# Patient Record
Sex: MALE | Race: BLACK OR AFRICAN AMERICAN | NOT HISPANIC OR LATINO
[De-identification: names, ages, dates, MRNs, and addresses within clinical notes are randomized per-mention and may not be internally consistent; named-entity substitution may affect disease eponyms.]

---

## 2020-11-05 PROBLEM — Z00.00 ENCOUNTER FOR PREVENTIVE HEALTH EXAMINATION: Status: ACTIVE | Noted: 2020-11-05

## 2020-11-11 ENCOUNTER — APPOINTMENT (OUTPATIENT)
Dept: CARDIOLOGY | Facility: CLINIC | Age: 85
End: 2020-11-11
Payer: MEDICARE

## 2020-11-11 VITALS
DIASTOLIC BLOOD PRESSURE: 65 MMHG | HEIGHT: 68 IN | WEIGHT: 138 LBS | HEART RATE: 61 BPM | SYSTOLIC BLOOD PRESSURE: 120 MMHG | BODY MASS INDEX: 20.92 KG/M2

## 2020-11-11 DIAGNOSIS — Z72.89 OTHER PROBLEMS RELATED TO LIFESTYLE: ICD-10-CM

## 2020-11-11 DIAGNOSIS — Z83.3 FAMILY HISTORY OF DIABETES MELLITUS: ICD-10-CM

## 2020-11-11 DIAGNOSIS — R56.9 UNSPECIFIED CONVULSIONS: ICD-10-CM

## 2020-11-11 PROCEDURE — 99205 OFFICE O/P NEW HI 60 MIN: CPT

## 2020-11-11 PROCEDURE — 93000 ELECTROCARDIOGRAM COMPLETE: CPT

## 2020-11-11 RX ORDER — NEPHROCAP 1 MG
CAP ORAL DAILY
Refills: 0 | Status: ACTIVE | COMMUNITY

## 2020-11-11 NOTE — HISTORY OF PRESENT ILLNESS
[FreeTextEntry1] :  This 89 year old male recently relocated to [place] from [place] and is seeking to establish care. He had TAVR in [place]  a few months ago,and a stent about a year ago- he  still feels dyspnea on exertion ( no change since prior to procedures), . He goes to [facility] 3 days a week.\par He denies chest pain, dyspnea at rest , palpitations or syncope.\par He denies pnd, orthopnea or pedal edema\par He thinks he may have been having seizures which began December 2019.\par He lost his wife July 2020. While in [place] he had an episode and was taken to the er at [facility] in [place] and a few days later at [facility].\par He will be seeing Malissa Tom this friday to establish care.

## 2020-12-08 ENCOUNTER — APPOINTMENT (OUTPATIENT)
Dept: CARDIOLOGY | Facility: CLINIC | Age: 85
End: 2020-12-08
Payer: MEDICARE

## 2020-12-08 VITALS
HEART RATE: 68 BPM | BODY MASS INDEX: 20.92 KG/M2 | DIASTOLIC BLOOD PRESSURE: 60 MMHG | WEIGHT: 138 LBS | OXYGEN SATURATION: 100 % | HEIGHT: 68 IN | SYSTOLIC BLOOD PRESSURE: 140 MMHG

## 2020-12-08 PROCEDURE — 93280 PM DEVICE PROGR EVAL DUAL: CPT

## 2020-12-08 PROCEDURE — 99072 ADDL SUPL MATRL&STAF TM PHE: CPT

## 2020-12-08 RX ORDER — GLIMEPIRIDE 4 MG/1
4 TABLET ORAL
Refills: 0 | Status: ACTIVE | COMMUNITY

## 2020-12-08 RX ORDER — NITROGLYCERIN 400 UG/1
0.4 SPRAY ORAL
Refills: 0 | Status: ACTIVE | COMMUNITY

## 2020-12-08 NOTE — DISCUSSION/SUMMARY
[Pacemaker Function Normal] : normal pacemaker function [Patient] : the patient [Family] : the patient's family [de-identified] : Follow up with Dr. Villanueva on 12/23/20 as scheduled. Device interrogation in 6 months.

## 2020-12-08 NOTE — REASON FOR VISIT
[Follow-up Device Check] : is here today for a follow-up device check visit for [Other ___] : [unfilled] [Family Member] : family member

## 2020-12-08 NOTE — PROCEDURE
[No] : not [NSR] : normal sinus rhythm [Pacemaker] : pacemaker [DDD] : DDD [de-identified] : St. Flip [de-identified] : Assurity MRI 4340 [de-identified] : [id number] [de-identified] : 9/14/2018 [de-identified] : 50/90 [de-identified] : 11.2-12.4 years [de-identified] : increased PVARP to 325ms

## 2020-12-08 NOTE — PHYSICAL EXAM
[Normal Appearance] : normal appearance [Well Groomed] : well groomed [General Appearance - In No Acute Distress] : no acute distress [Heart Rate And Rhythm] : heart rate and rhythm were normal [] : no respiratory distress [Respiration, Rhythm And Depth] : normal respiratory rhythm and effort

## 2020-12-08 NOTE — REVIEW OF SYSTEMS
[Feeling Fatigued] : feeling fatigued [Dyspnea on exertion] : dyspnea during exertion [Headache] : no headache [Chest Pain] : no chest pain [Palpitations] : no palpitations [Dizziness] : no dizziness

## 2020-12-08 NOTE — HISTORY OF PRESENT ILLNESS
improved [SOB] : dyspnea [Palpitations] : no palpitations [Syncope] : no syncope [Dizziness] : no dizziness [Chest Pain] : no chest pain or discomfort [de-identified] : \par 89 year old male with aortic stenosis s/p TAVR, CAD s/p PCI 2019 and most recently on 9/1/2020 s/p CHRIS of LAD D1, PTCA of LAD and ostium of D1, PTCA of severe ISR of Ramus Intermedius\par \par \par , ESRD on HD via right chest HD cath, hypertension, hyperlipidemia, DM type 2, seizures, s/p St[employer] Flip dual chamber pacemaker on 9/14/2018 for symptomatic bradycardia.

## 2020-12-16 ENCOUNTER — APPOINTMENT (OUTPATIENT)
Dept: NEPHROLOGY | Facility: CLINIC | Age: 85
End: 2020-12-16

## 2020-12-23 ENCOUNTER — APPOINTMENT (OUTPATIENT)
Dept: CARDIOLOGY | Facility: CLINIC | Age: 85
End: 2020-12-23
Payer: MEDICARE

## 2020-12-23 VITALS
DIASTOLIC BLOOD PRESSURE: 80 MMHG | HEART RATE: 82 BPM | BODY MASS INDEX: 20.83 KG/M2 | SYSTOLIC BLOOD PRESSURE: 138 MMHG | WEIGHT: 137 LBS

## 2020-12-23 VITALS — SYSTOLIC BLOOD PRESSURE: 148 MMHG | DIASTOLIC BLOOD PRESSURE: 68 MMHG

## 2020-12-23 DIAGNOSIS — Z87.438 PERSONAL HISTORY OF OTHER DISEASES OF MALE GENITAL ORGANS: ICD-10-CM

## 2020-12-23 PROCEDURE — 99358 PROLONG SERVICE W/O CONTACT: CPT

## 2020-12-23 PROCEDURE — 93000 ELECTROCARDIOGRAM COMPLETE: CPT

## 2020-12-23 PROCEDURE — 99215 OFFICE O/P EST HI 40 MIN: CPT

## 2020-12-23 PROCEDURE — 99072 ADDL SUPL MATRL&STAF TM PHE: CPT

## 2020-12-23 RX ORDER — AMIODARONE HYDROCHLORIDE 200 MG/1
200 TABLET ORAL DAILY
Refills: 0 | Status: DISCONTINUED | COMMUNITY
End: 2020-12-23

## 2020-12-23 NOTE — REASON FOR VISIT
[Follow-Up - Clinic] : a clinic follow-up of [Other: _____] : [unfilled] [FreeTextEntry2] : 1 month [FreeTextEntry1] : tavr

## 2020-12-23 NOTE — HISTORY OF PRESENT ILLNESS
[FreeTextEntry1] :  This 89 year old male recently relocated to [place] from [place] and is seeking to establish care. He had TAVR in [place]  a few months ago,and a stent about a year ago- he  still feels dyspnea on exertion ( no change since prior to procedures), . He goes to dialysis [facility] 3 days a week, will be changing to [facility].\par He denies ,, palpitations or syncope.\par He denies pnd, orthopnea or pedal edema\par He thinks he may have been having seizures which began December 2019.He had another seizure yesterday, he is under the care of Dr Tidwell neurology.He will be having an mri.\par He lost his wife July 2020. While in [place] he had an episode and was taken to the er at [facility] in [place] and a few days later at [facility].\par He is now under the care of Dr Rios. \par last week he had mild left sided chest discomfort  while sleeping, .it has not recurred. he feels dyspneic with exertion.

## 2021-01-01 ENCOUNTER — APPOINTMENT (OUTPATIENT)
Dept: CARDIOLOGY | Facility: CLINIC | Age: 86
End: 2021-01-01

## 2021-01-01 ENCOUNTER — RX CHANGE (OUTPATIENT)
Age: 86
End: 2021-01-01

## 2021-01-01 ENCOUNTER — NON-APPOINTMENT (OUTPATIENT)
Age: 86
End: 2021-01-01

## 2021-01-01 ENCOUNTER — APPOINTMENT (OUTPATIENT)
Dept: CARDIOLOGY | Facility: CLINIC | Age: 86
End: 2021-01-01
Payer: MEDICARE

## 2021-01-01 ENCOUNTER — RX RENEWAL (OUTPATIENT)
Age: 86
End: 2021-01-01

## 2021-01-01 ENCOUNTER — RESULT REVIEW (OUTPATIENT)
Age: 86
End: 2021-01-01

## 2021-01-01 ENCOUNTER — TRANSCRIPTION ENCOUNTER (OUTPATIENT)
Age: 86
End: 2021-01-01

## 2021-01-01 VITALS
DIASTOLIC BLOOD PRESSURE: 56 MMHG | DIASTOLIC BLOOD PRESSURE: 50 MMHG | HEART RATE: 73 BPM | SYSTOLIC BLOOD PRESSURE: 112 MMHG | OXYGEN SATURATION: 94 % | SYSTOLIC BLOOD PRESSURE: 120 MMHG | HEART RATE: 70 BPM

## 2021-01-01 VITALS
SYSTOLIC BLOOD PRESSURE: 110 MMHG | OXYGEN SATURATION: 95 % | HEART RATE: 74 BPM | DIASTOLIC BLOOD PRESSURE: 50 MMHG | HEART RATE: 70 BPM | OXYGEN SATURATION: 95 % | HEIGHT: 66 IN | SYSTOLIC BLOOD PRESSURE: 110 MMHG | DIASTOLIC BLOOD PRESSURE: 50 MMHG

## 2021-01-01 VITALS
DIASTOLIC BLOOD PRESSURE: 78 MMHG | HEIGHT: 66 IN | WEIGHT: 150 LBS | BODY MASS INDEX: 24.11 KG/M2 | SYSTOLIC BLOOD PRESSURE: 122 MMHG

## 2021-01-01 VITALS — TEMPERATURE: 97.7 F | SYSTOLIC BLOOD PRESSURE: 112 MMHG | DIASTOLIC BLOOD PRESSURE: 80 MMHG | HEIGHT: 66 IN

## 2021-01-01 DIAGNOSIS — I27.20 PULMONARY HYPERTENSION, UNSPECIFIED: ICD-10-CM

## 2021-01-01 DIAGNOSIS — Z95.2 PRESENCE OF PROSTHETIC HEART VALVE: ICD-10-CM

## 2021-01-01 DIAGNOSIS — I25.10 ATHEROSCLEROTIC HEART DISEASE OF NATIVE CORONARY ARTERY W/OUT ANGINA PECTORIS: ICD-10-CM

## 2021-01-01 DIAGNOSIS — R07.89 OTHER CHEST PAIN: ICD-10-CM

## 2021-01-01 DIAGNOSIS — Z95.5 PRESENCE OF CORONARY ANGIOPLASTY IMPLANT AND GRAFT: ICD-10-CM

## 2021-01-01 DIAGNOSIS — I10 ESSENTIAL (PRIMARY) HYPERTENSION: ICD-10-CM

## 2021-01-01 DIAGNOSIS — I34.0 NONRHEUMATIC MITRAL (VALVE) INSUFFICIENCY: ICD-10-CM

## 2021-01-01 DIAGNOSIS — I47.2 VENTRICULAR TACHYCARDIA: ICD-10-CM

## 2021-01-01 DIAGNOSIS — E78.5 HYPERLIPIDEMIA, UNSPECIFIED: ICD-10-CM

## 2021-01-01 DIAGNOSIS — R06.00 DYSPNEA, UNSPECIFIED: ICD-10-CM

## 2021-01-01 DIAGNOSIS — I51.9 HEART DISEASE, UNSPECIFIED: ICD-10-CM

## 2021-01-01 DIAGNOSIS — E11.9 TYPE 2 DIABETES MELLITUS W/OUT COMPLICATIONS: ICD-10-CM

## 2021-01-01 DIAGNOSIS — Z86.79 PERSONAL HISTORY OF OTHER DISEASES OF THE CIRCULATORY SYSTEM: ICD-10-CM

## 2021-01-01 LAB
ANION GAP SERPL CALC-SCNC: 17 MMOL/L
BUN SERPL-MCNC: 42 MG/DL
CALCIUM SERPL-MCNC: 8.6 MG/DL
CHLORIDE SERPL-SCNC: 91 MMOL/L
CO2 SERPL-SCNC: 25 MMOL/L
CREAT SERPL-MCNC: 4.52 MG/DL
CRP SERPL HS-MCNC: 35.16 MG/L
GLUCOSE SERPL-MCNC: 181 MG/DL
POTASSIUM SERPL-SCNC: NORMAL MMOL/L
SODIUM SERPL-SCNC: 134 MMOL/L

## 2021-01-01 PROCEDURE — 93000 ELECTROCARDIOGRAM COMPLETE: CPT

## 2021-01-01 PROCEDURE — 93280 PM DEVICE PROGR EVAL DUAL: CPT

## 2021-01-01 PROCEDURE — 99214 OFFICE O/P EST MOD 30 MIN: CPT | Mod: 25

## 2021-01-01 PROCEDURE — 36415 COLL VENOUS BLD VENIPUNCTURE: CPT

## 2021-01-01 PROCEDURE — 99072 ADDL SUPL MATRL&STAF TM PHE: CPT

## 2021-01-01 PROCEDURE — 99214 OFFICE O/P EST MOD 30 MIN: CPT

## 2021-01-01 PROCEDURE — 99215 OFFICE O/P EST HI 40 MIN: CPT | Mod: 25

## 2021-01-01 PROCEDURE — 99213 OFFICE O/P EST LOW 20 MIN: CPT | Mod: 25

## 2021-01-01 PROCEDURE — 99213 OFFICE O/P EST LOW 20 MIN: CPT

## 2021-01-01 PROCEDURE — 90471 IMMUNIZATION ADMIN: CPT

## 2021-01-01 PROCEDURE — 90662 IIV NO PRSV INCREASED AG IM: CPT

## 2021-01-01 PROCEDURE — 99215 OFFICE O/P EST HI 40 MIN: CPT

## 2021-01-01 RX ORDER — LEVETIRACETAM 500 MG/1
500 TABLET, FILM COATED ORAL
Refills: 0 | Status: ACTIVE | COMMUNITY

## 2021-01-01 RX ORDER — LISINOPRIL 2.5 MG/1
2.5 TABLET ORAL DAILY
Qty: 90 | Refills: 3 | Status: DISCONTINUED | COMMUNITY
Start: 2021-01-28 | End: 2021-01-01

## 2021-01-01 RX ORDER — SACUBITRIL AND VALSARTAN 24; 26 MG/1; MG/1
24-26 TABLET, FILM COATED ORAL TWICE DAILY
Qty: 180 | Refills: 3 | Status: ACTIVE | COMMUNITY
Start: 2021-01-01

## 2021-01-01 RX ORDER — IBUPROFEN 200 MG/1
200 TABLET ORAL TWICE DAILY
Refills: 0 | Status: ACTIVE | COMMUNITY
Start: 2021-01-01

## 2021-01-01 RX ORDER — AMIODARONE HYDROCHLORIDE 400 MG/1
400 TABLET ORAL
Qty: 90 | Refills: 0 | Status: COMPLETED | COMMUNITY
Start: 2021-01-01

## 2021-01-01 RX ORDER — AMIODARONE HYDROCHLORIDE 200 MG/1
200 TABLET ORAL DAILY
Qty: 30 | Refills: 5 | Status: DISCONTINUED | COMMUNITY
Start: 2021-01-01 | End: 2021-01-01

## 2021-01-15 ENCOUNTER — APPOINTMENT (OUTPATIENT)
Dept: CARDIOLOGY | Facility: CLINIC | Age: 86
End: 2021-01-15
Payer: MEDICARE

## 2021-01-15 VITALS
DIASTOLIC BLOOD PRESSURE: 66 MMHG | OXYGEN SATURATION: 100 % | HEIGHT: 68 IN | WEIGHT: 145 LBS | SYSTOLIC BLOOD PRESSURE: 140 MMHG | HEART RATE: 89 BPM | BODY MASS INDEX: 21.98 KG/M2

## 2021-01-15 PROCEDURE — 93280 PM DEVICE PROGR EVAL DUAL: CPT

## 2021-01-15 PROCEDURE — 99072 ADDL SUPL MATRL&STAF TM PHE: CPT

## 2021-01-15 NOTE — DISCUSSION/SUMMARY
[Pacemaker Function Normal] : normal pacemaker function [Patient] : the patient [Family] : the patient's family [de-identified] : Follow up with Dr. Villanueva on on 6/8/21 as scheduled.

## 2021-01-15 NOTE — PHYSICAL EXAM
[Normal Appearance] : normal appearance [Well Groomed] : well groomed [General Appearance - In No Acute Distress] : no acute distress [Heart Rate And Rhythm] : heart rate and rhythm were normal [] : no respiratory distress [Respiration, Rhythm And Depth] : normal respiratory rhythm and effort [Heart Sounds] : normal S1 and S2 [FreeTextEntry1] : trace ankle edema [Auscultation Breath Sounds / Voice Sounds] : lungs were clear to auscultation bilaterally

## 2021-01-15 NOTE — PROCEDURE
[No] : not [NSR] : normal sinus rhythm [Pacemaker] : pacemaker [DDD] : DDD [None] : none [de-identified] : St. Flip [de-identified] : Assurity MRI 6123 [de-identified] : [id number] [de-identified] : 9/14/2018 [de-identified] : 50/90 [de-identified] : 10.7-12.3 years

## 2021-01-15 NOTE — HISTORY OF PRESENT ILLNESS
[de-identified] : \par Mr. Spence reports dyspnea on exertion, unchanged. He denies chest pain, palpitations, dizziness or syncope.  At last visit, amiodarone discontinued. He presents today for full device analysis. He is scheduled for HD this afternoon. \par \par 89 year old male with aortic stenosis s/p TAVR, CAD s/p PCI 2019 and most recently on 9/1/2020 s/p CHRIS of LAD D1, PTCA of LAD and ostium of D1, PTCA of severe ISR of Ramus Intermedius, ESRD on HD via right chest HD cath, hypertension, hyperlipidemia, DM type 2, seizures, s/p St. Flip dual chamber pacemaker on 9/14/2018 for symptomatic bradycardia.\par \par

## 2021-01-15 NOTE — REVIEW OF SYSTEMS
[Feeling Fatigued] : feeling fatigued [Dyspnea on exertion] : dyspnea during exertion [Headache] : no headache [Chest Pain] : no chest pain [Palpitations] : no palpitations [Dizziness] : no dizziness [Confusion] : no confusion was observed

## 2021-01-20 ENCOUNTER — NON-APPOINTMENT (OUTPATIENT)
Age: 86
End: 2021-01-20

## 2021-01-22 ENCOUNTER — NON-APPOINTMENT (OUTPATIENT)
Age: 86
End: 2021-01-22

## 2021-01-28 ENCOUNTER — APPOINTMENT (OUTPATIENT)
Dept: CARDIOLOGY | Facility: CLINIC | Age: 86
End: 2021-01-28
Payer: MEDICARE

## 2021-01-28 ENCOUNTER — TRANSCRIPTION ENCOUNTER (OUTPATIENT)
Age: 86
End: 2021-01-28

## 2021-01-28 VITALS
SYSTOLIC BLOOD PRESSURE: 120 MMHG | DIASTOLIC BLOOD PRESSURE: 50 MMHG | HEIGHT: 68 IN | TEMPERATURE: 97 F | WEIGHT: 144.13 LBS | OXYGEN SATURATION: 100 % | BODY MASS INDEX: 21.85 KG/M2 | HEART RATE: 71 BPM

## 2021-01-28 PROCEDURE — 99213 OFFICE O/P EST LOW 20 MIN: CPT

## 2021-01-28 PROCEDURE — 99072 ADDL SUPL MATRL&STAF TM PHE: CPT

## 2021-01-28 RX ORDER — FLUDROCORTISONE ACETATE 0.1 MG/1
0.1 TABLET ORAL
Refills: 0 | Status: DISCONTINUED | COMMUNITY
End: 2021-01-28

## 2021-01-28 NOTE — REASON FOR VISIT
[Follow-Up - Clinic] : a clinic follow-up of [Dyspnea] : dyspnea [FreeTextEntry1] : Mr. Spence presents for follow up visit. Accompanied by son Luis. \par \par Patient reports worsening dyspnea on exertion. States he feels weak. He denies chest pain, palpitations, dizziness or syncope. \par \par Son Luis reports that patient had seizures on 2 HD sessions, 1/18 and 1/20/21. He is now taking Keppra twice daily. Son says that he is making arrangements for brain MRI. \par \par He is starting physical therapy. Son says that patient felt better after initial physical therapy session. \par \par

## 2021-01-28 NOTE — HISTORY OF PRESENT ILLNESS
[FreeTextEntry1] : 89 year old male with aortic stenosis s/p TAVR 9/5/20, CAD s/p PCI 2019 and 9/1/2020 s/p CHRIS of LAD D1 and PTCA of severe ISR of Ramus, ESRD on HD, hypertension, hyperlipidemia, DM type 2, seizure, sick sinus syndrome s/p St. Flip dual chamber pacemaker on 9/14/2018, BPH.

## 2021-01-28 NOTE — REVIEW OF SYSTEMS
[Feeling Fatigued] : feeling fatigued [Shortness Of Breath] : shortness of breath [Lower Ext Edema] : lower extremity edema [Headache] : no headache [Chest  Pressure] : no chest pressure [Chest Pain] : no chest pain [Palpitations] : no palpitations [Cough] : no cough [Abdominal Pain] : no abdominal pain [Dizziness] : no dizziness

## 2021-01-28 NOTE — DISCUSSION/SUMMARY
[Patient] : the patient [___ Week(s)] : [unfilled] week(s) [With Me] : with me [FreeTextEntry1] : Follow up with Dr. Villanueva on 3/23/21 as scheduled.

## 2021-01-28 NOTE — ASSESSMENT
[FreeTextEntry1] : Above findings discussed with Dr. Villanueva. Plan to add ACE-i, have labs drawn with dialysis labs, obtain CXR if indicated.

## 2021-01-28 NOTE — PHYSICAL EXAM
[Normal Appearance] : normal appearance [Well Groomed] : well groomed [General Appearance - In No Acute Distress] : no acute distress [] : no respiratory distress [Respiration, Rhythm And Depth] : normal respiratory rhythm and effort [Auscultation Breath Sounds / Voice Sounds] : lungs were clear to auscultation bilaterally [Heart Rate And Rhythm] : heart rate and rhythm were normal [Heart Sounds] : normal S1 and S2 [Murmurs] : no murmurs present [Cyanosis, Localized] : no localized cyanosis [Skin Color & Pigmentation] : normal skin color and pigmentation [Oriented To Time, Place, And Person] : oriented to person, place, and time [Impaired Insight] : insight and judgment were intact [Affect] : the affect was normal [FreeTextEntry1] : uses walker, gait steady

## 2021-02-11 ENCOUNTER — APPOINTMENT (OUTPATIENT)
Dept: CARDIOLOGY | Facility: CLINIC | Age: 86
End: 2021-02-11
Payer: MEDICARE

## 2021-02-11 VITALS
SYSTOLIC BLOOD PRESSURE: 110 MMHG | OXYGEN SATURATION: 97 % | TEMPERATURE: 97 F | DIASTOLIC BLOOD PRESSURE: 52 MMHG | HEIGHT: 68 IN | WEIGHT: 143.25 LBS | HEART RATE: 56 BPM | BODY MASS INDEX: 21.71 KG/M2

## 2021-02-11 PROCEDURE — 99072 ADDL SUPL MATRL&STAF TM PHE: CPT

## 2021-02-11 PROCEDURE — 99213 OFFICE O/P EST LOW 20 MIN: CPT

## 2021-02-11 NOTE — REASON FOR VISIT
[Follow-Up - Clinic] : a clinic follow-up of [Dyspnea] : dyspnea [Cardiomyopathy] : cardiomyopathy [FreeTextEntry1] : At last visit, started lisinopril 2.5mg daily. Mr. Spence presents for follow up. Accompanied by liborio Smith. \par \par Patient reports dyspnea on exertion. He denies chest pain, palpitations, dizziness or syncope. Liborio Smith reports that patient has started physical therapy and patient appears to feel better, has less shortness of breath after PT. Liborio Smith reports that patient appears weak. \par \par He is pending a brain MRI and labs were not drawn with dialysis labs as requested on last visit. \par

## 2021-04-08 ENCOUNTER — APPOINTMENT (OUTPATIENT)
Dept: CARDIOLOGY | Facility: CLINIC | Age: 86
End: 2021-04-08
Payer: MEDICARE

## 2021-04-08 ENCOUNTER — NON-APPOINTMENT (OUTPATIENT)
Age: 86
End: 2021-04-08

## 2021-04-08 VITALS
TEMPERATURE: 98.6 F | HEIGHT: 66 IN | SYSTOLIC BLOOD PRESSURE: 120 MMHG | WEIGHT: 150 LBS | DIASTOLIC BLOOD PRESSURE: 48 MMHG | BODY MASS INDEX: 24.11 KG/M2 | HEART RATE: 84 BPM

## 2021-04-08 PROCEDURE — 99072 ADDL SUPL MATRL&STAF TM PHE: CPT

## 2021-04-08 PROCEDURE — 99215 OFFICE O/P EST HI 40 MIN: CPT

## 2021-04-08 PROCEDURE — 93000 ELECTROCARDIOGRAM COMPLETE: CPT

## 2021-04-08 RX ORDER — TICAGRELOR 90 MG/1
90 TABLET ORAL
Qty: 180 | Refills: 3 | Status: DISCONTINUED | COMMUNITY
Start: 2020-12-31 | End: 2021-04-08

## 2021-04-08 NOTE — REASON FOR VISIT
[Follow-Up - Clinic] : a clinic follow-up of [Other: _____] : [unfilled] [FreeTextEntry2] : 4 month [FreeTextEntry1] : tavr

## 2021-04-08 NOTE — HISTORY OF PRESENT ILLNESS
[FreeTextEntry1] :  This 89 year old male recently relocated to [place] from [place] and is seeking to establish care. He had TAVR in [place]  a few months ago,and a stent about a year ago- he  still feels dyspnea on exertion ( no change since prior to procedures), . He goes to dialysis [facility] 3 days a week, will be changing to [place].\par He had a new av fistula placed last month.\par \par He denies ,, palpitations or syncope. He is having increasing dyspnea on exertion. He has had 2 episodes of chest pain since last visit, the most recent was last week. After walking in a store  he felt weak  and had "light " left sided discomfort, he vomited with relief. \par He denies pnd, orthopnea or pedal edema\par \par He ran out of his statin one week ago. \par He had an MRI of his brain last week and has followup with Dr Tidwell.\par \par He lost his wife July 2020. \par

## 2021-05-27 NOTE — PROCEDURE
[Pacemaker] : pacemaker [St. Flip] : [employer] Flip [No] : not [NSR] : normal sinus rhythm [DDD] : DDD [None] : none [Counters Reset] : the counters were reset [de-identified] : Assurity MRI 0339 [de-identified] : [id number] [de-identified] : 9/14/2018 [de-identified] : 50/90 [de-identified] : 10.7-12 years [de-identified] : Normal device function. Since 1/15/21, there were 35 ATR mode switches with longest lasting 25 minutes, burden < 1%. There were 68 nonsustained ventricular events, with last episode on 5/19/21.

## 2021-05-27 NOTE — HISTORY OF PRESENT ILLNESS
[de-identified] : \par 89 year old male with aortic stenosis s/p TAVR, CAD s/p PCI 2019 and most recently on 9/1/2020 s/p CHRIS of LAD, PTCA of LAD D1, PTCA of severe ISR of Ramus, ESRD on HD, hypertension, hyperlipidemia, DM type 2, seizures, symptomatic bradycardia s/p [employer] Flip dual chamber pacemaker on 9/14/2018.\par \par Review of recent [employer] Flip device transmission showed several episodes of VT, some lasting 2 minutes. Amiodarone was restarted, now on 400mg BID. He presents today for follow up visit and full device analysis. \par \par Mr. Spence reports dyspnea on exertion. He denies chest pain, palpitations, dizziness or syncope.

## 2021-05-27 NOTE — DISCUSSION/SUMMARY
[Pacemaker Function Normal] : normal pacemaker function [Patient] : the patient [Family] : the patient's family [de-identified] : routine follow up as scheduled on 6/8/21 [FreeTextEntry1] : Decrease amiodarone to 400mg daily.

## 2021-05-27 NOTE — REVIEW OF SYSTEMS
[Dyspnea on exertion] : dyspnea during exertion [Fever] : no fever [Chills] : no chills [Chest Discomfort] : no chest discomfort [Lower Ext Edema] : no extremity edema [Palpitations] : no palpitations [Syncope] : no syncope [Cough] : no cough [Wheezing] : no wheezing [Rash] : no rash [Dizziness] : no dizziness [Confusion] : no confusion was observed

## 2021-05-27 NOTE — PHYSICAL EXAM
[Normal Appearance] : normal appearance [Well Groomed] : well groomed [General Appearance - In No Acute Distress] : no acute distress [Heart Rate And Rhythm] : heart rate and rhythm were normal [Heart Sounds] : normal S1 and S2 [Edema] : no peripheral edema present [FreeTextEntry1] : right arm AV fistula, +bruit + thrill [] : no respiratory distress [Respiration, Rhythm And Depth] : normal respiratory rhythm and effort [Auscultation Breath Sounds / Voice Sounds] : lungs were clear to auscultation bilaterally [Cyanosis, Localized] : no localized cyanosis

## 2021-06-08 NOTE — DISCUSSION/SUMMARY
[Pacemaker Function Normal] : normal pacemaker function [Patient] : the patient [de-identified] : Routine follow up in 6 months. Continue home monitoring [FreeTextEntry1] : Follow up with Dr. Villanueva as scheduled on 7/15/2021

## 2021-06-08 NOTE — HISTORY OF PRESENT ILLNESS
[de-identified] : \par 89 year old male with aortic stenosis s/p TAVR, CAD s/p PCI 2019 and most recently on 9/1/2020 s/p CHRIS of LAD, PTCA of LAD D1, PTCA of severe ISR of Ramus, ESRD on HD, hypertension, hyperlipidemia, DM type 2, seizures, symptomatic bradycardia s/p [employer] Flip dual chamber pacemaker on 9/14/2018. \par \par At last visit, amiodarone decreased to 400mg daily. He presents today for follow up visit and full device analysis. \par \par Mr. Spence reports dyspnea on exertion. He denies chest pain, palpitations, dizziness or syncope.

## 2021-06-08 NOTE — REVIEW OF SYSTEMS
[SOB] : shortness of breath [Fever] : no fever [Headache] : no headache [Chills] : no chills [Feeling Fatigued] : not feeling fatigued [Chest Discomfort] : no chest discomfort [Palpitations] : no palpitations [Syncope] : no syncope [Rash] : no rash [Dizziness] : no dizziness [Confusion] : no confusion was observed [Easy Bleeding] : no tendency for easy bleeding [Easy Bruising] : no tendency for easy bruising

## 2021-06-08 NOTE — PROCEDURE
[No] : not [NSR] : normal sinus rhythm [Pacemaker] : pacemaker [[employer] Flip] : [employer]. Flip [DDD] : DDD [None] : none [Counters Reset] : the counters were reset [de-identified] : Assurity MRI 8384 [de-identified] : [id number] [de-identified] : 9/14/2018 [de-identified] : 50/90 [de-identified] : 10.8-12.9 years [de-identified] : Normal device function. No events.

## 2021-06-08 NOTE — PHYSICAL EXAM
[Normal Appearance] : normal appearance [Well Groomed] : well groomed [General Appearance - In No Acute Distress] : no acute distress [Heart Rate And Rhythm] : heart rate and rhythm were normal [Heart Sounds] : normal S1 and S2 [Edema] : no peripheral edema present [] : no respiratory distress [Respiration, Rhythm And Depth] : normal respiratory rhythm and effort [Auscultation Breath Sounds / Voice Sounds] : lungs were clear to auscultation bilaterally [Cyanosis, Localized] : no localized cyanosis [FreeTextEntry1] : right arm AV fistula, +bruit + thrill

## 2021-07-15 NOTE — REASON FOR VISIT
[Follow-Up - Clinic] : a clinic follow-up of [Other: _____] : [unfilled] [FreeTextEntry2] : 3 month [FreeTextEntry1] : tavr

## 2021-07-15 NOTE — HISTORY OF PRESENT ILLNESS
[FreeTextEntry1] :  This 89 year old male. is s/p TAVR in [place]  2020 ,and a stent 2019,he  still feels dyspnea on exertion ( no change since prior to procedures), . He goes to dialysis [facility] 3 days a week, will be changing to [facility].\par \par Since last vist he had one er visit in june for vomiting and weakness, cxr negative. (meditech reviewed)\par He denies  chest pain,palpitations or syncope. He is having increasing dyspnea on exertion.(6 steps in his house)\par He denies pnd, orthopnea or pedal edema. He now does PT.\par \par He lost his wife July 2020. \par

## 2021-10-08 PROBLEM — Z86.79 HISTORY OF SICK SINUS SYNDROME: Status: ACTIVE | Noted: 2020-12-23

## 2021-10-08 PROBLEM — I34.0 MITRAL INSUFFICIENCY: Status: ACTIVE | Noted: 2021-01-01

## 2021-10-08 PROBLEM — E11.9 T2DM (TYPE 2 DIABETES MELLITUS): Status: ACTIVE | Noted: 2020-11-11

## 2021-10-08 PROBLEM — R06.00 DYSPNEA ON EXERTION: Status: ACTIVE | Noted: 2021-01-28

## 2021-10-08 PROBLEM — Z95.5 STENTED CORONARY ARTERY: Status: ACTIVE | Noted: 2020-12-08

## 2021-10-08 PROBLEM — Z95.2 S/P TAVR (TRANSCATHETER AORTIC VALVE REPLACEMENT): Status: ACTIVE | Noted: 2020-11-11

## 2021-10-08 PROBLEM — E78.5 DYSLIPIDEMIA: Status: ACTIVE | Noted: 2020-11-11

## 2021-10-08 PROBLEM — I27.20 PULMONARY HYPERTENSION: Status: ACTIVE | Noted: 2021-04-08

## 2021-10-08 NOTE — HISTORY OF PRESENT ILLNESS
[FreeTextEntry1] :  This 89 year old male. is s/p TAVR in [place]  2020 ,and a stent 2019,he  still feels dyspnea on exertion ( no change since prior to procedures), . He goes to dialysis [facility] 3 days a week, will be changing to [facility].\par \par Since last vist he had one er visit in june for vomiting and weakness, cxr negative. (meditech reviewed)\par He denies  chest pain,palpitations or syncope. He is having increasing dyspnea on exertion.(6 steps in his house)\par He denies pnd, orthopnea or pedal edema. He now does PT.\par \par He lost his wife July 2020. \par

## 2021-10-08 NOTE — CARDIOLOGY SUMMARY
[de-identified] : 8/12/2021- ef 45%, roche petar TAVR 22/9mmhg , moderate MR, LVH, PASP 58mmhg [LVEF ___%] : LVEF [unfilled]% [___] : [unfilled]

## 2021-10-08 NOTE — REASON FOR VISIT
[Follow-Up - Clinic] : a clinic follow-up of [FreeTextEntry2] : 3 month [FreeTextEntry1] : tavr [Other: _____] : [unfilled]

## 2021-10-28 PROBLEM — R07.89 ATYPICAL CHEST PAIN: Status: ACTIVE | Noted: 2021-01-01

## 2021-10-28 PROBLEM — I25.10 CHRONIC CORONARY ARTERY DISEASE: Status: ACTIVE | Noted: 2020-11-11

## 2021-10-28 PROBLEM — I10 ESSENTIAL HYPERTENSION: Status: ACTIVE | Noted: 2020-11-11

## 2021-10-28 NOTE — PHYSICAL EXAM
[No Acute Distress] : no acute distress [Normal S1, S2] : normal S1, S2 [Clear Lung Fields] : clear lung fields [No Edema] : no edema [No Rash] : no rash [Moves all extremities] : moves all extremities [No Focal Deficits] : no focal deficits [Normal Speech] : normal speech [Alert and Oriented] : alert and oriented [de-identified] : sitting on wheelchair

## 2021-10-28 NOTE — HISTORY OF PRESENT ILLNESS
[FreeTextEntry1] : 89 year old male with aortic stenosis s/p TAVR 9/5/20, CAD s/p PCI 2019 and 9/1/2020 s/p CHRIS of LAD D1 and PTCA of severe ISR of Ramus, ESRD on HD, hypertension, hyperlipidemia, DM type 2, seizure, sick sinus syndrome s/p [employer] Flip dual chamber pacemaker on 9/14/2018, BPH, LV dysfunction EF 45%, severe pulmonary HTN.

## 2021-10-28 NOTE — REASON FOR VISIT
[Formal Caregiver] : formal caregiver [FreeTextEntry1] : At last visit, discontinued lisinopril and started Entresto after 36 hour washout period. Mr. Spence presents for follow up visit and nonfasting laboratories. \par \par His son Luis was on the phone at today's visit. \par \par Last night, son Luis saw patient in the bathroom with "seizure like" activity. Patient seemed "out of it". Son assisted patient back to bed and he was noted to have another event. Son called 911 and EMS arrived. Son reports he was told patient had low sugar and he was given an IV. Blood pressure was ok. Son gave patient a peanut butter and jelly sandwich. EMS advised patient to be transferred to the hospital for evaluation but patient refused. \par \par Patient and son report occasional episodes of right sided chest pain when patient lays on his side. Non-radiating, no associated symptoms, resolves spontaneously. Mr. Spence denies SOB, palpitations, dizziness or syncope. \par

## 2021-10-28 NOTE — REVIEW OF SYSTEMS
[Fever] : no fever [Headache] : no headache [Chills] : no chills [Feeling Fatigued] : not feeling fatigued [SOB] : no shortness of breath [Chest Discomfort] : chest discomfort [Lower Ext Edema] : no extremity edema [Palpitations] : no palpitations [Syncope] : no syncope [Cough] : no cough [Rash] : no rash [Dizziness] : no dizziness [Confusion] : no confusion was observed [Easy Bleeding] : no tendency for easy bleeding [Easy Bruising] : no tendency for easy bruising [FreeTextEntry2] : sitting on wheelchair

## 2021-12-10 PROBLEM — I47.2 VENTRICULAR TACHYCARDIA: Status: ACTIVE | Noted: 2021-01-01

## 2021-12-10 PROBLEM — I51.9 LV DYSFUNCTION: Status: ACTIVE | Noted: 2021-01-01

## 2021-12-10 NOTE — HISTORY OF PRESENT ILLNESS
[FreeTextEntry1] : 89 year old male with aortic stenosis s/p TAVR 9/5/20, CAD s/p PCI 2019 and 9/1/2020 s/p CHRIS of LAD D1 and PTCA of severe ISR of Ramus, ESRD on HD, hypertension, hyperlipidemia, DM type 2, seizure, sick sinus syndrome s/p [employer] Flip dual chamber pacemaker on 9/14/2018, BPH, LV dysfunction EF 45%, severe pulmonary HTN.

## 2021-12-10 NOTE — REVIEW OF SYSTEMS
[Feeling Fatigued] : feeling fatigued [SOB] : no shortness of breath [Chest Discomfort] : no chest discomfort [Palpitations] : no palpitations [Syncope] : no syncope [Cough] : no cough [Wheezing] : no wheezing [Abdominal Pain] : no abdominal pain [Dizziness] : no dizziness [Easy Bruising] : no tendency for easy bruising

## 2021-12-10 NOTE — REASON FOR VISIT
[FreeTextEntry1] : Mr. Spence presented to [facility] ED on 12/2 with c/o weakness. Found with elevated LFTs. Statin was discontinued and amiodarone decreased to 200mg daily. Mr. Spence is here for fasting laboratories. Accompanied by liborio Luis.

## 2021-12-10 NOTE — PHYSICAL EXAM
[No Acute Distress] : no acute distress [Normal S1, S2] : normal S1, S2 [Clear Lung Fields] : clear lung fields [Moves all extremities] : moves all extremities [Alert and Oriented] : alert and oriented [de-identified] : sitting on wheelchair

## 2022-01-01 ENCOUNTER — NON-APPOINTMENT (OUTPATIENT)
Age: 87
End: 2022-01-01

## 2022-01-01 ENCOUNTER — TRANSCRIPTION ENCOUNTER (OUTPATIENT)
Age: 87
End: 2022-01-01

## 2022-01-01 ENCOUNTER — RESULT REVIEW (OUTPATIENT)
Age: 87
End: 2022-01-01

## 2022-01-01 ENCOUNTER — APPOINTMENT (OUTPATIENT)
Dept: CARDIOLOGY | Facility: CLINIC | Age: 87
End: 2022-01-01
Payer: COMMERCIAL

## 2022-01-01 ENCOUNTER — APPOINTMENT (OUTPATIENT)
Dept: GASTROENTEROLOGY | Facility: CLINIC | Age: 87
End: 2022-01-01
Payer: MEDICARE

## 2022-01-01 ENCOUNTER — APPOINTMENT (OUTPATIENT)
Dept: CARDIOLOGY | Facility: CLINIC | Age: 87
End: 2022-01-01

## 2022-01-01 VITALS — DIASTOLIC BLOOD PRESSURE: 50 MMHG | HEART RATE: 56 BPM | SYSTOLIC BLOOD PRESSURE: 110 MMHG | OXYGEN SATURATION: 99 %

## 2022-01-01 VITALS
WEIGHT: 140 LBS | BODY MASS INDEX: 22.5 KG/M2 | DIASTOLIC BLOOD PRESSURE: 58 MMHG | SYSTOLIC BLOOD PRESSURE: 112 MMHG | HEIGHT: 66 IN | HEART RATE: 72 BPM | TEMPERATURE: 97.2 F

## 2022-01-01 DIAGNOSIS — Z99.2 END STAGE RENAL DISEASE: ICD-10-CM

## 2022-01-01 DIAGNOSIS — R74.01 ELEVATION OF LEVELS OF LIVER TRANSAMINASE LEVELS: ICD-10-CM

## 2022-01-01 DIAGNOSIS — N18.6 END STAGE RENAL DISEASE: ICD-10-CM

## 2022-01-01 DIAGNOSIS — K52.9 NONINFECTIVE GASTROENTERITIS AND COLITIS, UNSPECIFIED: ICD-10-CM

## 2022-01-01 DIAGNOSIS — Z95.0 PRESENCE OF CARDIAC PACEMAKER: ICD-10-CM

## 2022-01-01 LAB
ALBUMIN SERPL ELPH-MCNC: 3.4 G/DL
ALP BLD-CCNC: 99 U/L
ALT SERPL-CCNC: 19 U/L
AST SERPL-CCNC: 18 U/L
BILIRUB DIRECT SERPL-MCNC: 0.1 MG/DL
BILIRUB INDIRECT SERPL-MCNC: 0.2 MG/DL
BILIRUB SERPL-MCNC: 0.4 MG/DL
PROT SERPL-MCNC: 7.3 G/DL

## 2022-01-01 PROCEDURE — 99203 OFFICE O/P NEW LOW 30 MIN: CPT

## 2022-01-01 PROCEDURE — 93280 PM DEVICE PROGR EVAL DUAL: CPT

## 2022-01-01 RX ORDER — ATORVASTATIN CALCIUM 80 MG/1
80 TABLET, FILM COATED ORAL
Refills: 0 | Status: ACTIVE | COMMUNITY

## 2022-01-01 RX ORDER — VIT A/VIT C/VIT E/ZINC/COPPER 2148-113
TABLET ORAL
Refills: 0 | Status: ACTIVE | COMMUNITY

## 2022-01-01 RX ORDER — ATORVASTATIN CALCIUM 20 MG/1
20 TABLET, FILM COATED ORAL
Qty: 90 | Refills: 2 | Status: DISCONTINUED | COMMUNITY
Start: 2021-01-01 | End: 2022-01-01

## 2022-01-01 RX ORDER — METOPROLOL SUCCINATE 25 MG/1
25 TABLET, EXTENDED RELEASE ORAL
Qty: 90 | Refills: 3 | Status: ACTIVE | COMMUNITY
Start: 2020-12-23

## 2022-01-01 RX ORDER — AMIODARONE HYDROCHLORIDE 200 MG/1
200 TABLET ORAL
Qty: 90 | Refills: 2 | Status: ACTIVE | COMMUNITY
Start: 2021-01-01

## 2022-01-01 RX ORDER — FINASTERIDE 5 MG/1
5 TABLET, FILM COATED ORAL DAILY
Refills: 0 | Status: DISCONTINUED | COMMUNITY
End: 2022-01-01

## 2022-01-01 RX ORDER — ERGOCALCIFEROL (VITAMIN D2) 1250 MCG
50000 CAPSULE ORAL
Refills: 0 | Status: DISCONTINUED | COMMUNITY
End: 2022-01-01

## 2022-01-17 PROBLEM — N18.6 ESRD ON DIALYSIS: Status: ACTIVE | Noted: 2020-11-11

## 2022-01-17 PROBLEM — R74.01 TRANSAMINITIS: Status: ACTIVE | Noted: 2021-01-01

## 2022-01-17 NOTE — CARDIOLOGY SUMMARY
[de-identified] : 8/12/2021- ef 45%, roche petar TAVR 22/9mmhg , moderate MR, LVH, PASP 58mmhg [___] : [unfilled] [LVEF ___%] : LVEF [unfilled]% [___] : [unfilled]

## 2022-01-17 NOTE — REASON FOR VISIT
[Follow-Up - Clinic] : a clinic follow-up of [FreeTextEntry2] : 6 month [FreeTextEntry1] : tavr [Other: _____] : [unfilled]

## 2022-01-17 NOTE — HISTORY OF PRESENT ILLNESS
[FreeTextEntry1] : Mr Spence is  s/p TAVR in [place]  2020 ,and a stent 2019,he  still feels dyspnea on exertion ( no change since prior to procedures), .\par  He goes to dialysis [facility].\par \par  He was in the ER is december for weakness, amiodarone decreased, statin discontinued due to elevated lfts. \par He denies  chest pain,palpitations or syncope. He is having increasing dyspnea on exertion.(6 steps in his house)\par \par \par He lost his wife July 2020. \par

## 2022-03-08 PROBLEM — Z95.0 CARDIAC PACEMAKER: Status: ACTIVE | Noted: 2020-11-11

## 2022-03-08 NOTE — HISTORY OF PRESENT ILLNESS
[de-identified] : \par 89 year old male with aortic stenosis s/p TAVR, CAD s/p PCI 2019 and most recently on 9/1/2020 s/p CHRIS of LAD, PTCA of LAD D1, PTCA of severe ISR of Ramus, ESRD on HD, hypertension, hyperlipidemia, DM type 2, seizures, symptomatic bradycardia s/p [employer] Flip dual chamber pacemaker on 9/14/2018. \par \par Recent presentation to [place] ED for witnessed seizure x 2 after HD. Given 1 dose of Keppra 1gm in ED. Labs WNL. Mr. Spence reports he feels ok. He denies chest pain, SOB, palpitations, dizziness or syncope. He does not recall hospital visit. \par

## 2022-03-08 NOTE — REVIEW OF SYSTEMS
[Lower Ext Edema] : lower extremity edema [Fever] : no fever [Chills] : no chills [SOB] : no shortness of breath [Chest Discomfort] : no chest discomfort [Palpitations] : no palpitations [Dizziness] : no dizziness

## 2022-03-08 NOTE — DISCUSSION/SUMMARY
[Pacemaker Function Normal] : normal pacemaker function [Patient] : the patient [de-identified] : Routine follow up in 6 months

## 2022-03-08 NOTE — PROCEDURE
[Pacemaker] : pacemaker [[employer] Flip] : [employer] Flip [Threshold Testing Performed] : Threshold testing was performed [None] : none [Counters Reset] : the counters were reset [No] : not [NSR] : normal sinus rhythm [DDD] : DDD [de-identified] : Assurity MRI 1121 [de-identified] : [id number] [de-identified] : 9/14/2018 [de-identified] : 50/90 [de-identified] : 11-12.7 years [de-identified] : Normal device function. No events.

## 2022-03-08 NOTE — REASON FOR VISIT
[Follow-up Device Check] : is here today for a follow-up device check visit for [Family Member] : family member [Formal Caregiver] : formal caregiver

## 2022-03-08 NOTE — PHYSICAL EXAM
[Well Groomed] : well groomed [General Appearance - In No Acute Distress] : no acute distress [Heart Rate And Rhythm] : heart rate and rhythm were normal [Heart Sounds] : normal S1 and S2 [] : no respiratory distress [Respiration, Rhythm And Depth] : normal respiratory rhythm and effort [Auscultation Breath Sounds / Voice Sounds] : lungs were clear to auscultation bilaterally [FreeTextEntry1] : lower extremity edema

## 2022-04-20 PROBLEM — K52.9 FREQUENT STOOLS: Status: ACTIVE | Noted: 2022-01-01

## 2022-04-20 NOTE — HISTORY OF PRESENT ILLNESS
[FreeTextEntry1] : Mr. AURY BROWN is a 89 year old male with a PMH of ESRD (on dialysis), HTN, HLD, \par Presents with frequent stools. \par Bowel movement every hour. \par Soft, brown stool. \par Small amount of stool.\par Denies change in stool consistency.\par Denies abdominal pain, N/V. \par His son gave him Dulcolax the last two days.\par Hospitalized last week for low BP. \par He was not given antibiotics. \par Son reports that patient has a bowel movement every 2 hours typically. \par

## 2022-04-20 NOTE — ASSESSMENT
[FreeTextEntry1] : 1. Frequent bowel movements\par - Ongoing for many years that he goes frequently, however now goes hourly.\par - Stool testing \par - Start Florastor daily. \par - Rec Metamucil however patient is not a great drinker so may not be best.\par - No more laxatives.

## 2022-04-20 NOTE — PHYSICAL EXAM
[General Appearance - Alert] : alert [General Appearance - In No Acute Distress] : in no acute distress [Abdomen Soft] : soft [Abdomen Tenderness] : non-tender [Oriented To Time, Place, And Person] : oriented to person, place, and time

## 2022-04-20 NOTE — REVIEW OF SYSTEMS
[Fever] : no fever [Chills] : no chills [Abdominal Pain] : no abdominal pain [Vomiting] : no vomiting [Constipation] : no constipation [Diarrhea] : no diarrhea [Melena] : no melena [Abdominal Swelling] : no abdominal swelling [Bloating] : no bloating [Pain When Defecating] : no pain when defecating [Rectal Pain] : no rectal pain [Anal Pain] : no anal pain [Maroon Stools] : no maroon stools

## 2022-05-12 ENCOUNTER — TRANSCRIPTION ENCOUNTER (OUTPATIENT)
Age: 87
End: 2022-05-12

## 2022-05-16 ENCOUNTER — NON-APPOINTMENT (OUTPATIENT)
Age: 87
End: 2022-05-16

## 2022-06-24 ENCOUNTER — APPOINTMENT (OUTPATIENT)
Dept: CARDIOLOGY | Facility: CLINIC | Age: 87
End: 2022-06-24